# Patient Record
Sex: MALE | Race: WHITE | NOT HISPANIC OR LATINO | Employment: OTHER | URBAN - METROPOLITAN AREA
[De-identification: names, ages, dates, MRNs, and addresses within clinical notes are randomized per-mention and may not be internally consistent; named-entity substitution may affect disease eponyms.]

---

## 2017-10-06 ENCOUNTER — OFFICE VISIT (OUTPATIENT)
Dept: FAMILY MEDICINE | Facility: OTHER | Age: 82
End: 2017-10-06
Payer: COMMERCIAL

## 2017-10-06 VITALS — TEMPERATURE: 98 F | SYSTOLIC BLOOD PRESSURE: 198 MMHG | HEART RATE: 53 BPM | DIASTOLIC BLOOD PRESSURE: 83 MMHG

## 2017-10-06 DIAGNOSIS — I10 HYPERTENSION, GOAL BELOW 140/90: ICD-10-CM

## 2017-10-06 DIAGNOSIS — Z23 NEED FOR VACCINATION: ICD-10-CM

## 2017-10-06 DIAGNOSIS — S61.209A OPEN WOUND OF FINGER, INITIAL ENCOUNTER: Primary | ICD-10-CM

## 2017-10-06 PROCEDURE — 99212 OFFICE O/P EST SF 10 MIN: CPT | Mod: 25 | Performed by: FAMILY MEDICINE

## 2017-10-06 PROCEDURE — 90471 IMMUNIZATION ADMIN: CPT | Performed by: FAMILY MEDICINE

## 2017-10-06 PROCEDURE — 90715 TDAP VACCINE 7 YRS/> IM: CPT | Performed by: FAMILY MEDICINE

## 2017-10-06 PROCEDURE — 12002 RPR S/N/AX/GEN/TRNK2.6-7.5CM: CPT | Performed by: FAMILY MEDICINE

## 2017-10-06 ASSESSMENT — PAIN SCALES - GENERAL: PAINLEVEL: NO PAIN (1)

## 2017-10-06 NOTE — PROGRESS NOTES
Jose L Frausto is a 83 year old male who presents to the clinic with a laceration on the finger sustained at 230 pm today This is a non-work related injury.    Mechanism of injury: router saw at home.    Associated symptoms: Denies numbness, weakness, or loss of function  Last tetanus booster within 10 years: no, given today    Current Outpatient Prescriptions   Medication Sig Dispense Refill     CENTRUM SILVER OR one per day       ASPIRIN 81 MG OR CHEW 1 TABLET DAILY         OBJECTIVE:   /83  Pulse 53  Temp 98  F (36.7  C) (Oral)  The patient appears today in alert distress    Size of laceration: 3.4 centimeters  Characteristics of the laceration: active bleeding and clean  Tendon function intact: yes  Sensation to light touch intact: yes  Pulses intact: yes  }      Prepped and draped in the usual sterile fashion  Wound cleaned with HIBICLENS  Wound cleaned with sterile water      SUBJECTIVE:   83 year old male sustained laceration of finger 3 hours ago. Nature of injury: Pt was using a router and the wood slipped, catching his finger in the router, lacerating in at least four areas. Tetanus vaccination status reviewed: Td vaccination indicated and given today.     OBJECTIVE:   Patient appears well, vitals are normal. 3 fairly parallel curvilinear lacerations on distal right middle finger.  Most proximal is about 12 mm, next is about 12 mm, and most distal is about 1 cm noted.  Some other small lacerations on other parts of finger, but these aren't bleeding, and there's no significant gap present.  Description: clean wound edges, no foreign bodies. Neurovascular and tendon structures are intact.    ASSESSMENT:   Laceration as described.    PLAN:   Anesthesia with Lidocaine 1% plain digital block. Wound cleansed, debrided of visible foreign material and necrotic tissue, and sutured using 10 interrupted 4-0 Prolene sutures. Dressing applied.  Wound care instructions provided.  Observe for any signs of  infection or other problems.  Return for suture removal in 10-14 days.      ICD-10-CM    1. Open wound of finger, initial encounter S61.209A Between 2.6cm - 7.5 cm   2. Hypertension, goal below 140/90 I10    3. Need for vaccination Z23 TDAP VACCINE (ADACEL) [05238.002]     1st  Administration  [31502]      1.  See above.  2.  Encouraged pt to monitor and return if not further reduction.  Repeat BP was mildly improved.  3.  See above.

## 2017-10-06 NOTE — PATIENT INSTRUCTIONS
Wound Care Instructions    1.  Keep Vaseline or antibiotic ointment on wound for a few days until the skin edges have come together.  Do not let it dry out and form a scab as this will make the resulting scar more noticeable.    2.  After 24 hours, may wash gently with soap and water.  After 48 hours, you can soak it, if needed.    3.  If desired, vitamin E oil for 2 weeks after antibiotic ointment may help to decrease scarring.    4.  Protect the area from sun for up to one year afterward as the scar is continuing to remodel.  Sun exposure will also make the resulting scar more noticeable.    5.  Call if the area is very red, tender, has a discharge or is very itchy while healing, or if you have any other questions.  These may be signs of early infection or allergy.    6.  Return for suture removal in 10-14 days.

## 2017-10-06 NOTE — MR AVS SNAPSHOT
After Visit Summary   10/6/2017    Jose L Frausto    MRN: [id number]           Patient Information     Date Of Birth          11/17/1933        Visit Information        Provider Department      10/6/2017 3:00 PM Talha Rodriguez MD [facility]        Today's Diagnoses     Need for vaccination    -  1      Care Instructions    Wound Care Instructions    1.  Keep Vaseline or antibiotic ointment on wound for a few days until the skin edges have come together.  Do not let it dry out and form a scab as this will make the resulting scar more noticeable.    2.  After 24 hours, may wash gently with soap and water.  After 48 hours, you can soak it, if needed.    3.  If desired, vitamin E oil for 2 weeks after antibiotic ointment may help to decrease scarring.    4.  Protect the area from sun for up to one year afterward as the scar is continuing to remodel.  Sun exposure will also make the resulting scar more noticeable.    5.  Call if the area is very red, tender, has a discharge or is very itchy while healing, or if you have any other questions.  These may be signs of early infection or allergy.    6.  Return for suture removal in 10-14 days.               Follow-ups after your visit        Who to contact     If you have questions or need follow up information about today's clinic visit or your schedule please contact [facility] directly at [phone].  Normal or non-critical lab and imaging results will be communicated to you by MyChart, letter or phone within 4 business days after the clinic has received the results. If you do not hear from us within 7 days, please contact the clinic through MyChart or phone. If you have a critical or abnormal lab result, we will notify you by phone as soon as possible.  Submit refill requests through [employer] or call your pharmacy and they will forward the refill request to us. Please allow 3 business days for your refill to be  "completed.          Additional Information About Your Visit        [email or web address] Information     [employer] lets you send messages to your doctor, view your test results, renew your prescriptions, schedule appointments and more. To sign up, go to [email or web address] . Click on \"Log in\" on the left side of the screen, which will take you to the Welcome page. Then click on \"Sign up Now\" on the right side of the page.     You will be asked to enter the access code listed below, as well as some personal information. Please follow the directions to create your username and password.     Your access code is: [email or web address]  Expires: 2018  5:57 PM     Your access code will  in 90 days. If you need help or a new code, please call your [place] clinic or [phone].        Care EveryWhere ID     This is your Care EveryWhere ID. This could be used by other organizations to access your [place] medical records  [id number]        Your Vitals Were     Pulse Temperature                53 98  F (36.7  C) (Oral)           Blood Pressure from Last 3 Encounters:   10/06/17 198/83   16 124/60   16 120/70    Weight from Last 3 Encounters:   16 138 lb 8 oz (62.8 kg)   16 140 lb (63.5 kg)   16 140 lb 1.6 oz (63.5 kg)              We Performed the Following     1st  Administration  [59448]     TDAP VACCINE (ADACEL) [74989.002]        Primary Care Provider Office Phone # Fax #    Ne Gabriel Dye [phone] [address] [place] [address]        Equal Access to Services     [facility] : Hadii tuyet Telles, demian key, qajeffrey bowie. So [facility] [phone].    ATENCIÓN: Si habla español, tiene a srinivasan disposición servicios gratuitos de asistencia lingüística. Llame al [phone].    We comply with applicable federal civil rights laws and [place] laws. We do not discriminate on the basis of race, color, " national origin, age, disability, sex, sexual orientation, or gender identity.            Thank you!     Thank you for choosing [facility]  for your care. Our goal is always to provide you with excellent care. Hearing back from our patients is one way we can continue to improve our services. Please take a few minutes to complete the written survey that you may receive in the mail after your visit with us. Thank you!             Your Updated Medication List - Protect others around you: Learn how to safely use, store and throw away your medicines at www.disposemymeds.org.          This list is accurate as of: 10/6/17  5:57 PM.  Always use your most recent med list.                   Brand Name Dispense Instructions for use Diagnosis    aspirin 81 MG chewable tablet      1 TABLET DAILY        CENTRUM SILVER PO      one per day

## 2017-10-18 ENCOUNTER — ALLIED HEALTH/NURSE VISIT (OUTPATIENT)
Dept: FAMILY MEDICINE | Facility: OTHER | Age: 82
End: 2017-10-18
Payer: COMMERCIAL

## 2017-10-18 DIAGNOSIS — Z48.02 VISIT FOR SUTURE REMOVAL: Primary | ICD-10-CM

## 2017-10-18 PROCEDURE — 99207 ZZC NO CHARGE NURSE ONLY: CPT

## 2017-10-18 NOTE — PROGRESS NOTES
"Jose L presents to the clinic today for  removal of sutures.  The patient has had the sutures in place for 12 days.    There has been no history of infection or drainage.    O: 10 sutures are seen located on the right hand, middle finger.  The wound is healing well with no signs of infection.    Tetanus status is up to date.    A: Suture removal.    P:  All sutures were easily removed today.  Routine wound care discussed.  The patient will follow up as needed.  Chief Complaint   Patient presents with     Suture Removal       Initial BP (P) 122/54 Estimated body mass index is 21.69 kg/(m^2) as calculated from the following:    Height as of 4/6/16: 5' 7\" (1.702 m).    Weight as of 4/6/16: 138 lb 8 oz (62.8 kg).  Medication Reconciliation: unable or not appropriate to perform    Sonya Cody RN, BSN     "

## 2017-10-18 NOTE — MR AVS SNAPSHOT
"              After Visit Summary   10/18/2017    Jose L Frausto    MRN: [id number]           Patient Information     Date Of Birth          1933        Visit Information        Provider Department      10/18/2017 9:30 AM NL RN [facility]        Today's Diagnoses     Visit for suture removal    -  1       Follow-ups after your visit        Who to contact     If you have questions or need follow up information about today's clinic visit or your schedule please contact [facility] directly at [phone].  Normal or non-critical lab and imaging results will be communicated to you by [employer], letter or phone within 4 business days after the clinic has received the results. If you do not hear from us within 7 days, please contact the clinic through [employer] or phone. If you have a critical or abnormal lab result, we will notify you by phone as soon as possible.  Submit refill requests through [email or web address] or call your pharmacy and they will forward the refill request to us. Please allow 3 business days for your refill to be completed.          Additional Information About Your Visit        MyChart Information     [email or web address] lets you send messages to your doctor, view your test results, renew your prescriptions, schedule appointments and more. To sign up, go to [email or web address] . Click on \"Log in\" on the left side of the screen, which will take you to the Welcome page. Then click on \"Sign up Now\" on the right side of the page.     You will be asked to enter the access code listed below, as well as some personal information. Please follow the directions to create your username and password.     Your access code is: [email or web address]  Expires: 2018  5:57 PM     Your access code will  in 90 days. If you need help or a new code, please call your [facility] or [phone].        Care EveryWhere ID     This is your Care EveryWhere ID. This could be used by other " organizations to access your [place] medical records  [id number]         Blood Pressure from Last 3 Encounters:   10/18/17 (P) 122/54   10/06/17 198/83   03/14/16 124/60    Weight from Last 3 Encounters:   04/06/16 138 lb 8 oz (62.8 kg)   03/21/16 140 lb (63.5 kg)   03/14/16 140 lb 1.6 oz (63.5 kg)              Today, you had the following     No orders found for display       Primary Care Provider Office Phone # Fax #    Ne Mylesjazmine Dye, BRIAN [phone] [address] [place] [address]        Equal Access to Services     [facility] : Hadii tuyet Telles, waaxda luqadaha, qaybta kaalmada moisés, jeffrey chapa. So [facility] [phone].    ATENCIÓN: Si habla español, tiene a srinivasan disposición servicios gratuitos de asistencia lingüística. Llame al [phone].    We comply with applicable federal civil rights laws and [place] laws. We do not discriminate on the basis of race, color, national origin, age, disability, sex, sexual orientation, or gender identity.            Thank you!     Thank you for choosing [facility]  for your care. Our goal is always to provide you with excellent care. Hearing back from our patients is one way we can continue to improve our services. Please take a few minutes to complete the written survey that you may receive in the mail after your visit with us. Thank you!             Your Updated Medication List - Protect others around you: Learn how to safely use, store and throw away your medicines at www.disposemymeds.org.          This list is accurate as of: 10/18/17  9:44 AM.  Always use your most recent med list.                   Brand Name Dispense Instructions for use Diagnosis    aspirin 81 MG chewable tablet      1 TABLET DAILY        CENTRUM SILVER PO      one per day

## 2018-01-29 ENCOUNTER — TELEPHONE (OUTPATIENT)
Dept: FAMILY MEDICINE | Facility: OTHER | Age: 83
End: 2018-01-29

## 2018-01-29 ENCOUNTER — OFFICE VISIT (OUTPATIENT)
Dept: FAMILY MEDICINE | Facility: OTHER | Age: 83
End: 2018-01-29
Payer: COMMERCIAL

## 2018-01-29 ENCOUNTER — RADIANT APPOINTMENT (OUTPATIENT)
Dept: GENERAL RADIOLOGY | Facility: OTHER | Age: 83
End: 2018-01-29
Attending: FAMILY MEDICINE
Payer: COMMERCIAL

## 2018-01-29 VITALS
DIASTOLIC BLOOD PRESSURE: 87 MMHG | BODY MASS INDEX: 21.97 KG/M2 | RESPIRATION RATE: 16 BRPM | HEIGHT: 67 IN | HEART RATE: 58 BPM | WEIGHT: 140 LBS | TEMPERATURE: 97.4 F | SYSTOLIC BLOOD PRESSURE: 169 MMHG

## 2018-01-29 DIAGNOSIS — S61.319A LACERATION OF FINGER OF LEFT HAND WITH DAMAGE TO NAIL, FOREIGN BODY PRESENCE UNSPECIFIED, UNSPECIFIED FINGER, INITIAL ENCOUNTER: ICD-10-CM

## 2018-01-29 DIAGNOSIS — S69.92XA HAND INJURY, LEFT, INITIAL ENCOUNTER: Primary | ICD-10-CM

## 2018-01-29 DIAGNOSIS — S69.92XA HAND INJURY, LEFT, INITIAL ENCOUNTER: ICD-10-CM

## 2018-01-29 PROCEDURE — 99207 ZZC NO CHARGE LOS: CPT | Performed by: FAMILY MEDICINE

## 2018-01-29 PROCEDURE — 12002 RPR S/N/AX/GEN/TRNK2.6-7.5CM: CPT | Performed by: FAMILY MEDICINE

## 2018-01-29 PROCEDURE — 73130 X-RAY EXAM OF HAND: CPT | Mod: LT

## 2018-01-29 ASSESSMENT — PAIN SCALES - GENERAL: PAINLEVEL: MILD PAIN (2)

## 2018-01-29 NOTE — MR AVS SNAPSHOT
After Visit Summary   1/29/2018    Jose L Frausto    MRN: [id number]           Patient Information     Date Of Birth          11/17/1933        Visit Information        Provider Department      1/29/2018 3:15 PM Talha Rodriguez MD [facility]        Today's Diagnoses     Hand injury, left, initial encounter    -  1    Laceration of finger of left hand with damage to nail, foreign body presence unspecified, unspecified finger, initial encounter          Care Instructions    Wound Care Instructions    1.  Keep Vaseline or antibiotic ointment on wound for a few days until the skin edges have come together.  Do not let it dry out and form a scab as this will make the resulting scar more noticeable.    2.  After 24 hours, may wash gently with soap and water.  After 48 hours, you can soak it, if needed.    3.  If desired, vitamin E oil for 2 weeks after antibiotic ointment may help to decrease scarring.    4.  Protect the area from sun for up to one year afterward as the scar is continuing to remodel.  Sun exposure will also make the resulting scar more noticeable.    5.  Call if the area is very red, tender, has a discharge or is very itchy while healing, or if you have any other questions.  These may be signs of early infection or allergy.    6.  Return for suture removal in about 10 days.               Follow-ups after your visit        Who to contact     If you have questions or need follow up information about today's clinic visit or your schedule please contact [facility] directly at [phone].  Normal or non-critical lab and imaging results will be communicated to you by MyChart, letter or phone within 4 business days after the clinic has received the results. If you do not hear from us within 7 days, please contact the clinic through MyChart or phone. If you have a critical or abnormal lab result, we will notify you by phone as soon as possible.  Submit  "refill requests through [employer] or call your pharmacy and they will forward the refill request to us. Please allow 3 business days for your refill to be completed.          Additional Information About Your Visit        [employer] Information     [employer] lets you send messages to your doctor, view your test results, renew your prescriptions, schedule appointments and more. To sign up, go to [email or web address].[email or web address][email or web address]/[email or web address] . Click on \"Log in\" on the left side of the screen, which will take you to the Welcome page. Then click on \"Sign up Now\" on the right side of the page.     You will be asked to enter the access code listed below, as well as some personal information. Please follow the directions to create your username and password.     Your access code is: [email or web address]  Expires: 2018  4:09 PM     Your access code will  in 90 days. If you need help or a new code, please call your [place] clinic or [phone].        Care EveryWhere ID     This is your Care EveryWhere ID. This could be used by other organizations to access your [place] medical records  [id number]        Your Vitals Were     Pulse Temperature Respirations Height BMI (Body Mass Index)       58 97.4  F (36.3  C) (Oral) 16 5' 7\" (1.702 m) 21.93 kg/m2        Blood Pressure from Last 3 Encounters:   18 188/76   10/18/17 (P) 122/54   10/06/17 198/83    Weight from Last 3 Encounters:   18 140 lb (63.5 kg)   16 138 lb 8 oz (62.8 kg)   16 140 lb (63.5 kg)               Primary Care Provider Fax #    Physician No Ref-Primary [phone]       No address on file        Equal Access to Services     GAGANDEEP DODSON : Yoselin Telles, demian key, jordan curiel, jeffrey chapa. So [facility] [phone].    ATENCIÓN: Si habla español, tiene a srinivasan disposición servicios gratuitos de asistencia lingüística. Llame al [phone].    We comply with applicable federal civil rights laws and " [place] laws. We do not discriminate on the basis of race, color, national origin, age, disability, sex, sexual orientation, or gender identity.            Thank you!     Thank you for choosing [facility]  for your care. Our goal is always to provide you with excellent care. Hearing back from our patients is one way we can continue to improve our services. Please take a few minutes to complete the written survey that you may receive in the mail after your visit with us. Thank you!             Your Updated Medication List - Protect others around you: Learn how to safely use, store and throw away your medicines at www.disposemymeds.org.          This list is accurate as of 1/29/18  4:09 PM.  Always use your most recent med list.                   Brand Name Dispense Instructions for use Diagnosis    aspirin 81 MG chewable tablet      1 TABLET DAILY        CENTRUM SILVER PO      one per day

## 2018-01-29 NOTE — PROGRESS NOTES
"  SUBJECTIVE:   Jose L Frausto is a 84 year old male who presents to clinic today for the following health issues:      Finger laceration  2nd, middle and ring finger on left hand was cut on table saw about 15 minutes ago  Up to date tetanus shot        Problem list and histories reviewed & adjusted, as indicated.  Additional history: as documented    Current Outpatient Prescriptions   Medication Sig Dispense Refill     CENTRUM SILVER OR one per day       ASPIRIN 81 MG OR CHEW 1 TABLET DAILY       No lab results found.   BP Readings from Last 3 Encounters:   01/29/18 169/87   10/18/17 (P) 122/54   10/06/17 198/83    Wt Readings from Last 3 Encounters:   01/29/18 140 lb (63.5 kg)   04/06/16 138 lb 8 oz (62.8 kg)   03/21/16 140 lb (63.5 kg)                    Reviewed and updated as needed this visit by clinical staff  Tobacco  Allergies  Meds  Med Hx  Surg Hx  Fam Hx  Soc Hx      Reviewed and updated as needed this visit by Provider         ROS:  Constitutional, HEENT, cardiovascular, pulmonary, gi and gu systems are negative, except as otherwise noted.    OBJECTIVE:     /87  Pulse 58  Temp 97.4  F (36.3  C) (Oral)  Resp 16  Ht 5' 7\" (1.702 m)  Wt 140 lb (63.5 kg)  BMI 21.93 kg/m2  Body mass index is 21.93 kg/(m^2).  GENERAL: healthy, alert and no distress  SKIN: Several ragged irregularly shaped lacerations on his index and ring finger of his left hand.  These measure a total of 30 cm.  The index finger laceration does involve the nail.  There is extensive skin loss.  What appeared to be lacerations on his middle finger are actually large areas of abraded missing skin.  With continued oozing of blood.    Diagnostic Test Results:  none     ASSESSMENT/PLAN:       ICD-10-CM    1. Hand injury, left, initial encounter S69.92XA XR Hand Left G/E 3 Views   2. Laceration of finger of left hand with damage to nail, foreign body presence unspecified, unspecified finger, initial encounter S61.319A XR Hand " Left G/E 3 Views     X-rays show no evidence of fracture.  After informed consent, his wounds were cleansed and sutured.  Due to the extensive skin loss and ragged edges there was some less than ideal cosmetic appearance of the lacerations.  Bleeding was controlled.  The abrasions were dressed with a pressure bandage only.  If he has further bleeding we could consider numbing the area and then using cautery to control the bleeding.  Discussed signs and symptoms of infection, and would have a very low threshold for starting antibiotics in this patient given the nail involvement and the deep nature of the lacerations.    See Patient Instructions    Talha Rodriguez MD, MD  [facility]    SUBJECTIVE:   84 year old male sustained laceration of left three middle fingers 1 hour ago. Nature of injury: He was cutting some wood with his table saw, and accidentally placed his fingers in the way when trying to reach to stabilize board.. Tetanus vaccination status reviewed: last tetanus booster within 10 years, tetanus re-vaccination not indicated.     OBJECTIVE:   Patient appears well, vitals are normal. Laceration 3 cm noted.  Description: ragged edges, contaminated. Neurovascular and tendon structures are intact.    ASSESSMENT:   Laceration as described.    PLAN:   Anesthesia with Lidocaine 1% plain via local infiltration. Wound cleansed, debrided of visible foreign material and necrotic tissue, and sutured. Dressing applied.  Wound care instructions provided.  Observe for any signs of infection or other problems.  Return for suture removal in 10 days.

## 2018-01-30 NOTE — PROGRESS NOTES
Formal x-ray report showed no fracture.  There may be some bone loss (osteopopenia) and some minor arthritis.    Thanks,    Dr. Rodriguez

## 2018-01-30 NOTE — TELEPHONE ENCOUNTER
Left message with Britt, to have patient call us back, he will be home after 1:00 today  Suly Mckeon RT (R)

## 2018-01-30 NOTE — TELEPHONE ENCOUNTER
Left message for pt to return call, when call is returned give information below.    Notes Recorded by Talha Rodrgiuez MD on 1/29/2018 at 6:47 PM  Formal x-ray report showed no fracture.  There may be some bone loss (osteopopenia) and some minor arthritis.    Thanks,    Dr. Michael Bran CMA ([facility])

## 2018-02-09 ENCOUNTER — ALLIED HEALTH/NURSE VISIT (OUTPATIENT)
Dept: FAMILY MEDICINE | Facility: OTHER | Age: 83
End: 2018-02-09
Payer: COMMERCIAL

## 2018-02-09 DIAGNOSIS — Z48.02 VISIT FOR SUTURE REMOVAL: Primary | ICD-10-CM

## 2018-02-09 PROCEDURE — 99207 ZZC NO CHARGE NURSE ONLY: CPT

## 2018-02-09 NOTE — MR AVS SNAPSHOT
"              After Visit Summary   2018    Jose L Frausto    MRN: [id number]           Patient Information     Date Of Birth          1933        Visit Information        Provider Department      2018 10:00 AM NL RN [facility]        Today's Diagnoses     Visit for suture removal    -  1       Follow-ups after your visit        Who to contact     If you have questions or need follow up information about today's clinic visit or your schedule please contact [facility] directly at [phone].  Normal or non-critical lab and imaging results will be communicated to you by MyChart, letter or phone within 4 business days after the clinic has received the results. If you do not hear from us within 7 days, please contact the clinic through [employer] or phone. If you have a critical or abnormal lab result, we will notify you by phone as soon as possible.  Submit refill requests through [employer] or call your pharmacy and they will forward the refill request to us. Please allow 3 business days for your refill to be completed.          Additional Information About Your Visit        MyChart Information     Z[employer]Code lets you send messages to your doctor, view your test results, renew your prescriptions, schedule appointments and more. To sign up, go to [email or web address] . Click on \"Log in\" on the left side of the screen, which will take you to the Welcome page. Then click on \"Sign up Now\" on the right side of the page.     You will be asked to enter the access code listed below, as well as some personal information. Please follow the directions to create your username and password.     Your access code is: [email or web address]  Expires: 2018  4:09 PM     Your access code will  in 90 days. If you need help or a new code, please call your [facility] or [phone].        Care EveryWhere ID     This is your Care EveryWhere ID. This could be used by other " organizations to access your [facility] medical records  [id number]         Blood Pressure from Last 3 Encounters:   01/29/18 169/87   10/18/17 (P) 122/54   10/06/17 198/83    Weight from Last 3 Encounters:   01/29/18 140 lb (63.5 kg)   04/06/16 138 lb 8 oz (62.8 kg)   03/21/16 140 lb (63.5 kg)              Today, you had the following     No orders found for display       Primary Care Provider Fax #    Physician No Ref-Primary [phone]       No address on file        Equal Access to Services     [facility] : Hadbean Telles, wagia key, jordan kaalmadeann curiel, jeffrey de luna . So [facility] [phone].    ATENCIÓN: Si habla español, tiene a srinivasan disposición servicios gratuitos de asistencia lingüística. [facility] [phone].    We comply with applicable federal civil rights laws and [place] laws. We do not discriminate on the basis of race, color, national origin, age, disability, sex, sexual orientation, or gender identity.            Thank you!     Thank you for choosing [facility]  for your care. Our goal is always to provide you with excellent care. Hearing back from our patients is one way we can continue to improve our services. Please take a few minutes to complete the written survey that you may receive in the mail after your visit with us. Thank you!             Your Updated Medication List - Protect others around you: Learn how to safely use, store and throw away your medicines at www.disposemymeds.org.          This list is accurate as of 2/9/18 10:51 AM.  Always use your most recent med list.                   Brand Name Dispense Instructions for use Diagnosis    aspirin 81 MG chewable tablet      1 TABLET DAILY        CENTRUM SILVER PO      one per day

## 2018-02-09 NOTE — PROGRESS NOTES
Jose L presents to the clinic today for  removal of sutures.  The patient has had the sutures in place for 11 days.    There has been no history of infection or drainage.    O: 7 sutures are seen located on the fingers.  The wound is healing well with no signs of infection.    A: Suture removal.    P:  All sutures were easily removed today.  Routine wound care discussed.  The patient will follow up as needed.    Sandhya Bryan, RN, BSN

## 2019-06-04 NOTE — PROGRESS NOTES
Subjective     Jose L Frausto is a 85 year old male who presents to clinic today for the following health issues:    HPI   Concern - Tick Bite   Onset: about a week     Description:   Pt has a tick bite that is still red and itchy. Just wants to make sure it's nothing to worry about. Unsure if pt removed all the tick. Right upper leg below buttocks     Intensity: moderate    Progression of Symptoms:  same    Removed tick he feels about <24 hours after tick had latched on and it now has been a week. He has a ER rash posterior upper thigh. Denies pain, drainage, fever, or body aches      Patient Active Problem List   Diagnosis     Primary osteoarthritis of first carpometacarpal joint of left hand     Hyperlipidemia     Essential hypertension     Elevated prostate specific antigen (PSA)     Cataract of both eyes     BPH (benign prostatic hyperplasia)     History reviewed. No pertinent surgical history.    Social History     Tobacco Use     Smoking status: Never Smoker     Smokeless tobacco: Never Used   Substance Use Topics     Alcohol use: Yes     Comment: rare     History reviewed. No pertinent family history.      Current Outpatient Medications   Medication Sig Dispense Refill     ASPIRIN 81 MG OR CHEW 1 TABLET DAILY       CENTRUM SILVER OR one per day       doxycycline hyclate (VIBRA-TABS) 100 MG tablet Take 1 tablet (100 mg) by mouth 2 times daily for 14 days 28 tablet 0     losartan (COZAAR) 50 MG tablet Take 50 mg by mouth daily       Allergies   Allergen Reactions     Sulfa Drugs      No lab results found.   BP Readings from Last 3 Encounters:   06/05/19 122/64   01/29/18 169/87   10/18/17 (P) 122/54    Wt Readings from Last 3 Encounters:   06/05/19 61.7 kg (136 lb)   01/29/18 63.5 kg (140 lb)   04/06/16 62.8 kg (138 lb 8 oz)                    Reviewed and updated as needed this visit by Provider         Review of Systems   ROS COMP: Constitutional, HEENT, cardiovascular, pulmonary, GI, ,  musculoskeletal, neuro, skin, endocrine and psych systems are negative, except as otherwise noted.      Objective    There were no vitals taken for this visit.  There is no height or weight on file to calculate BMI.  Physical Exam   GENERAL: healthy, alert and no distress  SKIN: Examination of the rash reveals: 8 mm erythema migraines rash posterior upper thigh     Assessment & Plan     1. Tick bite, initial encounter  Home care instructions were reviewed with the patient. The risks, benefits and treatment options of prescribed medications or other treatments have been discussed with the patient. The patient verbalized their understanding and should call or follow up if no improvement or if they develop further problems.  Recommend oral abx for 14 day due to high endemic area and ER rash.     - doxycycline hyclate (VIBRA-TABS) 100 MG tablet; Take 1 tablet (100 mg) by mouth 2 times daily for 14 days  Dispense: 28 tablet; Refill: 0  - Lyme Disease Nikki with reflex to WB Serum       Patient Instructions   Please take antibiotic with a probiotic daily not directly with the antibiotic for optimal good bacterial protection.     I will call you with your lab results and any further treatment.     Thank you  Chrissie Guerrier [facility]

## 2019-06-05 ENCOUNTER — OFFICE VISIT (OUTPATIENT)
Dept: FAMILY MEDICINE | Facility: OTHER | Age: 84
End: 2019-06-05
Payer: COMMERCIAL

## 2019-06-05 VITALS
WEIGHT: 136 LBS | OXYGEN SATURATION: 97 % | TEMPERATURE: 98.3 F | HEART RATE: 52 BPM | SYSTOLIC BLOOD PRESSURE: 122 MMHG | RESPIRATION RATE: 16 BRPM | BODY MASS INDEX: 21.3 KG/M2 | DIASTOLIC BLOOD PRESSURE: 64 MMHG
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
## 2019-06-05 PROBLEM — E78.5 HYPERLIPIDEMIA: Status: ACTIVE | Noted: 2018-02-14

## 2019-06-05 PROBLEM — H26.9 CATARACT OF BOTH EYES: Status: ACTIVE | Noted: 2018-03-19

## 2019-06-05 PROBLEM — I10 ESSENTIAL HYPERTENSION: Status: ACTIVE | Noted: 2018-07-31

## 2019-06-05 PROCEDURE — 36415 COLL VENOUS BLD VENIPUNCTURE: CPT | Performed by: NURSE PRACTITIONER

## 2019-06-05 PROCEDURE — 99213 OFFICE O/P EST LOW 20 MIN: CPT | Performed by: NURSE PRACTITIONER

## 2019-06-05 PROCEDURE — 86618 LYME DISEASE ANTIBODY: CPT | Performed by: NURSE PRACTITIONER

## 2019-06-05 RX ORDER — LOSARTAN POTASSIUM 50 MG/1
50 TABLET ORAL DAILY
COMMUNITY

## 2019-06-05 RX ORDER — DOXYCYCLINE HYCLATE 100 MG
100 TABLET ORAL 2 TIMES DAILY
Qty: 28 TABLET | Refills: 0 | Status: SHIPPED | OUTPATIENT
Start: 2019-06-05 | End: 2019-06-19

## 2019-06-05 NOTE — PATIENT INSTRUCTIONS
Please take antibiotic with a probiotic daily not directly with the antibiotic for optimal good bacterial protection.     I will call you with your lab results and any further treatment.     Thank you  Chrissie Guerrier CNP

## 2019-06-06 LAB — B BURGDOR IGG+IGM SER QL: 0.07 (ref 0–0.89)

## 2019-06-06 NOTE — RESULT ENCOUNTER NOTE
Please advise Jose L Frausto,  1933, that his lab results were negative lymes. He may discontinue antibiotic  [phone] (home)   Thank you  Chrissie Guerrier CNP

## 2019-06-07 ENCOUNTER — TELEPHONE (OUTPATIENT)
Dept: FAMILY MEDICINE | Facility: OTHER | Age: 84
End: 2019-06-07

## 2019-06-07 NOTE — TELEPHONE ENCOUNTER
Notes recorded by Malena Valiente CMA on 2019 at 10:25 AM CDT  Called and left message for patient to return call.     Malena Valiente MA     ------    Notes recorded by Chrissie Guerrier APRN CNP on 2019 at 6:34 PM CDT  Please advise Jose L Frausto,  1933, that his lab results were negative lymes. He may discontinue antibiotic  [phone] (home)   Thank you  Chrissie Guerrier CNP

## 2020-02-13 ENCOUNTER — OFFICE VISIT (OUTPATIENT)
Dept: FAMILY MEDICINE | Facility: OTHER | Age: 85
End: 2020-02-13
Payer: COMMERCIAL

## 2020-02-13 DIAGNOSIS — Z78.9 TRIAGE ASSESSMENT CLASS 3, NONURGENT: Primary | ICD-10-CM

## 2020-02-13 PROCEDURE — 99207 ZZC NO CHARGE NURSE ONLY: CPT

## 2020-12-06 ENCOUNTER — HEALTH MAINTENANCE LETTER (OUTPATIENT)
Age: 85
End: 2020-12-06

## 2021-09-25 ENCOUNTER — HEALTH MAINTENANCE LETTER (OUTPATIENT)
Age: 86
End: 2021-09-25

## 2021-10-06 NOTE — PATIENT INSTRUCTIONS
Wound Care Instructions    1.  Keep Vaseline or antibiotic ointment on wound for a few days until the skin edges have come together.  Do not let it dry out and form a scab as this will make the resulting scar more noticeable.    2.  After 24 hours, may wash gently with soap and water.  After 48 hours, you can soak it, if needed.    3.  If desired, vitamin E oil for 2 weeks after antibiotic ointment may help to decrease scarring.    4.  Protect the area from sun for up to one year afterward as the scar is continuing to remodel.  Sun exposure will also make the resulting scar more noticeable.    5.  Call if the area is very red, tender, has a discharge or is very itchy while healing, or if you have any other questions.  These may be signs of early infection or allergy.    6.  Return for suture removal in about 10 days.       
teach back/(1) partially meets; needs review/practice/verbalization

## 2022-01-15 ENCOUNTER — HEALTH MAINTENANCE LETTER (OUTPATIENT)
Age: 87
End: 2022-01-15

## 2022-06-29 ENCOUNTER — HOSPITAL ENCOUNTER (EMERGENCY)
Facility: CLINIC | Age: 87
Discharge: HOME | End: 2022-06-29
Attending: EMERGENCY MEDICINE | Admitting: EMERGENCY MEDICINE
Payer: COMMERCIAL

## 2022-06-29 VITALS
HEART RATE: 47 BPM | RESPIRATION RATE: 16 BRPM | WEIGHT: 136 LBS | BODY MASS INDEX: 21.3 KG/M2 | DIASTOLIC BLOOD PRESSURE: 76 MMHG | TEMPERATURE: 98 F | SYSTOLIC BLOOD PRESSURE: 157 MMHG | OXYGEN SATURATION: 98 %
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
## 2022-06-29 DIAGNOSIS — S80.261A TICK BITE OF RIGHT KNEE, INITIAL ENCOUNTER: ICD-10-CM

## 2022-06-29 PROCEDURE — 250N000013 HC RX MED GY IP 250 OP 250 PS 637: Performed by: EMERGENCY MEDICINE

## 2022-06-29 PROCEDURE — 86618 LYME DISEASE ANTIBODY: CPT | Performed by: EMERGENCY MEDICINE

## 2022-06-29 PROCEDURE — 99284 EMERGENCY DEPT VISIT MOD MDM: CPT | Performed by: EMERGENCY MEDICINE

## 2022-06-29 PROCEDURE — 99283 EMERGENCY DEPT VISIT LOW MDM: CPT | Performed by: EMERGENCY MEDICINE

## 2022-06-29 PROCEDURE — 36415 COLL VENOUS BLD VENIPUNCTURE: CPT | Performed by: EMERGENCY MEDICINE

## 2022-06-29 RX ORDER — DOXYCYCLINE HYCLATE 50 MG/1
200 CAPSULE ORAL ONCE
Status: COMPLETED | OUTPATIENT
Start: 2022-06-29 | End: 2022-06-29

## 2022-06-29 RX ADMIN — DOXYCYCLINE HYCLATE 200 MG: 50 CAPSULE ORAL at 12:08

## 2022-06-29 NOTE — ED PROVIDER NOTES
History     Chief Complaint   Patient presents with     Tick Removal     HPI  Jose L Frausto is a 88 year old male who presents to the emergency room for concern of tick bite.  He said he had been up in [place] for a few weeks, and does have a lot of wooded area around his house.  On Sunday he came home from [place], and on Monday noticed a tick behind his right knee.  There is an area of redness.  He said the tick was not engorged, and it seems like a larger tick than a deer tick.  He does have an area of redness around the spot where the tick attached.  Does have a history of joint pains and arthritis, but has not noticed it is worse than his baseline, nor is it migrating.  Has not been experiencing any fevers.  No other symptoms noted.  He was wondering if he needed to be treated for Lyme's disease as he is never dealt with a tick bite before.    Allergies:  Allergies   Allergen Reactions     Sulfa Drugs        Problem List:    Patient Active Problem List    Diagnosis Date Noted     Essential hypertension 07/31/2018     Priority: Medium     Cataract of both eyes 03/19/2018     Priority: Medium     Hyperlipidemia 02/14/2018     Priority: Medium     Primary osteoarthritis of first carpometacarpal joint of left hand 03/21/2016     Priority: Medium     BPH (benign prostatic hyperplasia) 11/07/2012     Priority: Medium     Elevated prostate specific antigen (PSA) 06/23/2006     Priority: Medium        Past Medical History:    History reviewed. No pertinent past medical history.    Past Surgical History:    History reviewed. No pertinent surgical history.    Family History:    History reviewed. No pertinent family history.    Social History:  Marital Status:   [2]  Social History     Tobacco Use     Smoking status: Never Smoker     Smokeless tobacco: Never Used   Substance Use Topics     Alcohol use: Yes     Comment: rare     Drug use: No        Medications:    ASPIRIN 81 MG OR CHEW  CENTRUM SILVER  OR  losartan (COZAAR) 50 MG tablet          Review of Systems   All other systems reviewed and are negative.      Physical Exam   BP: (!) 157/76  Pulse: (!) 47  Temp: 98  F (36.7  C)  Resp: 16  Weight: 61.7 kg (136 lb)  SpO2: 98 %      Physical Exam  Vitals and nursing note reviewed.   Constitutional:       General: He is not in acute distress.     Appearance: He is not diaphoretic.   HENT:      Head: Atraumatic.   Eyes:      General: No scleral icterus.     Pupils: Pupils are equal, round, and reactive to light.   Pulmonary:      Effort: Pulmonary effort is normal. No respiratory distress.   Musculoskeletal:         General: No swelling or tenderness. Normal range of motion.   Skin:     General: Skin is warm.      Capillary Refill: Capillary refill takes less than 2 seconds.                ED Course                 Procedures              Critical Care time:  none               No results found for this or any previous visit (from the past 24 hour(s)).    Medications   doxycycline hyclate (VIBRAMYCIN) capsule 200 mg (200 mg Oral Given 6/29/22 1208)       Assessments & Plan (with Medical Decision Making)  Jose L is an 88-year-old male presenting to the emergency room for concern over tick bite.  Found tick on Monday and has some areas of redness.  See history and focused physical exam as above  Pleasant 88-year-old male in no acute distress, is slightly hypertensive with blood pressure of 157/76, and a heart rate of 47, but he is alert, oriented, no acute distress.  Remainder of his vital signs are stable and he is afebrile.  Has no acute concerns other than potentially needing to be treated for tick bite.  He also states that he has to leave in the next 15 minutes to make it to an appointment in [place].  Did have a discussion about the patient over potential treatment options, and can also draw Lyme titers though the results would not be back today.  Could elect to prophylactically treat with a one-time dose  of doxycycline, send a prescription for several weeks of doxycycline and wait for pending lab work, or just get lab work and have him contact his primary doctor if the Lyme's titers are positive.  Ultimately, he elected to have the prophylactic dose of doxycycline.  We will also draw a Lyme's titers and discharge him since the lab results will not be available today.  He should follow up on these and also monitor his symptoms, since it is unclear how long tick was attached for, prophylactic dosing may not be effective at this interval and if Lyme's titers are positive he may need a longer course of treatment.  He is agreeable to this plan.  Discharged in no distress     I have reviewed the nursing notes.    I have reviewed the findings, diagnosis, plan and need for follow up with the patient.       Discharge Medication List as of 6/29/2022 12:12 PM          Final diagnoses:   Tick bite of right knee, initial encounter       6/29/2022   [facility] EMERGENCY DEPT     Trini Archibald,   06/29/22 9661

## 2022-06-29 NOTE — DISCHARGE INSTRUCTIONS
Given a dose of antibiotics in the emergency room today.  Hopefully this will act as prophylactic treatment after your tick bite and you will not develop Lyme's disease    You were also checked for Lyme's disease on blood work.  The results will be available in approximately 24 to 48 hours.  You may login to my chart and check for these results.  If positive, you have already been treated, but stay in close contact with your primary doctor in case symptoms do develop    If you have any new concerns or any additional issues, do not hesitate to return to the emergency room for evaluation

## 2022-06-29 NOTE — ED TRIAGE NOTES
Pt itched a tick off two days ago, back of knee on right leg     Triage Assessment     Row Name 06/29/22 1110       Triage Assessment (Adult)    Airway WDL WDL       Respiratory WDL    Respiratory WDL WDL

## 2022-06-30 LAB — B BURGDOR IGG+IGM SER QL: 0.04

## 2022-06-30 NOTE — RESULT ENCOUNTER NOTE
Final result for Lyme Disease Total Abs Bld with Reflex to Confirm CLIA is NEGATIVE.    No change in treatment per [facility] Lab Result Lyme Disease protocol.

## 2023-01-07 ENCOUNTER — HEALTH MAINTENANCE LETTER (OUTPATIENT)
Age: 88
End: 2023-01-07

## 2023-04-22 ENCOUNTER — HEALTH MAINTENANCE LETTER (OUTPATIENT)
Age: 88
End: 2023-04-22

## 2024-02-29 ENCOUNTER — OFFICE VISIT (OUTPATIENT)
Dept: URBAN - METROPOLITAN AREA CLINIC 44 | Facility: CLINIC | Age: 89
End: 2024-02-29
Payer: MEDICARE

## 2024-02-29 DIAGNOSIS — H35.3131 NONEXUDATIVE MACULAR DEGENERATION, EARLY DRY STAGE, BILATERAL: ICD-10-CM

## 2024-02-29 DIAGNOSIS — H04.123 TEAR FILM INSUFFICIENCY OF BILATERAL LACRIMAL GLANDS: Primary | ICD-10-CM

## 2024-02-29 DIAGNOSIS — H26.491 OTHER SECONDARY CATARACT, RIGHT EYE: ICD-10-CM

## 2024-02-29 PROCEDURE — 92134 CPTRZ OPH DX IMG PST SGM RTA: CPT | Performed by: OPTOMETRIST

## 2024-02-29 PROCEDURE — 92004 COMPRE OPH EXAM NEW PT 1/>: CPT | Performed by: OPTOMETRIST

## 2024-02-29 ASSESSMENT — KERATOMETRY
OD: 45.63
OS: 46.00

## 2024-02-29 ASSESSMENT — VISUAL ACUITY
OS: 20/25
OD: 20/30

## 2024-02-29 ASSESSMENT — INTRAOCULAR PRESSURE
OS: 19
OD: 16

## 2024-06-29 ENCOUNTER — HEALTH MAINTENANCE LETTER (OUTPATIENT)
Age: 89
End: 2024-06-29

## 2025-07-13 ENCOUNTER — HEALTH MAINTENANCE LETTER (OUTPATIENT)
Age: OVER 89
End: 2025-07-13